# Patient Record
(demographics unavailable — no encounter records)

---

## 2025-05-27 NOTE — HISTORY OF PRESENT ILLNESS
[TextBox_4] : TATY is here for evaluation with his mother today. He was born at term after an unassisted conception and uneventful pregnancy. He was then circumcised in the nursery. The family's concerns with redundancy of the skin following the circumcision. The penis has not changed in its configuration since the parents first noticed this. No urinary tract or penile redness or infections. He urinates without hematuria.  No family history of penile abnormalities.

## 2025-05-27 NOTE — PHYSICAL EXAM
[TextBox_92] :  Penis: Large fat causing penis to recess.  circumcised, straight without redundant skin, circumferential adhesions lysed; distinct penoscrotal and penopubic junctions. Meatus orthotopic without apparent stenosis. Testicles: Both testes in dependent position of scrotum without masses or tenderness. Scrotal/Inguinal: No palpable inguinal hernias, hydroceles

## 2025-05-27 NOTE — ASSESSMENT
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  The primary cause of this appearance in this case is the presence of large suprapubic fat.  The fat pushes the skin forward and makes the penis look recessed.  The loss of this fat with time will allow the skin to recess to the normal position and to make the penis not appear hidden. He also had adhesions that were lysed easily and this made it clear to the family that there was no redundant skin.  I recommended no surgical intervention at this time and to allow time for the fat to recede.  If there is substantial fat loss, and the penis appears hidden, another consultation was recommended

## 2025-05-27 NOTE — CONSULT LETTER
[FreeTextEntry1] : Dear Dr. DREW SEN ,  I had the pleasure of consulting on TATY ISDDIQUI today.  Below is my note regarding the office visit today.  Thank you so very much for allowing me to participate in TATY's  care.  Please don't hesitate to call me should any questions or issues arise .  Sincerely,   Joao Mcallister MD, FACS, [facility] Chief, Pediatric Urology Professor of Urology and Pediatrics [employer]  President, [employer] - [place] Section Past-President, Soci[employer] for Pediatric Urology